# Patient Record
Sex: FEMALE | URBAN - METROPOLITAN AREA
[De-identification: names, ages, dates, MRNs, and addresses within clinical notes are randomized per-mention and may not be internally consistent; named-entity substitution may affect disease eponyms.]

---

## 2019-06-10 ENCOUNTER — APPOINTMENT (OUTPATIENT)
Age: 44
Setting detail: DERMATOLOGY
End: 2019-12-29

## 2019-06-10 VITALS
SYSTOLIC BLOOD PRESSURE: 142 MMHG | WEIGHT: 170.6 LBS | OXYGEN SATURATION: 97 % | DIASTOLIC BLOOD PRESSURE: 93 MMHG | HEART RATE: 85 BPM | RESPIRATION RATE: 17 BRPM

## 2019-06-10 DIAGNOSIS — Z41.1 ENCOUNTER FOR COSMETIC SURGERY: ICD-10-CM

## 2019-06-10 PROCEDURE — OTHER CONSULTATION - LIPOSUCTION: OTHER

## 2019-06-10 PROCEDURE — OTHER CONSULTATION - ABDOMINOPLASTY: OTHER

## 2019-06-10 ASSESSMENT — LOCATION DETAILED DESCRIPTION DERM
LOCATION DETAILED: RIGHT LATERAL ABDOMEN
LOCATION DETAILED: LEFT LATERAL ABDOMEN

## 2019-06-10 ASSESSMENT — LOCATION SIMPLE DESCRIPTION DERM: LOCATION SIMPLE: ABDOMEN

## 2019-06-10 ASSESSMENT — LOCATION ZONE DERM: LOCATION ZONE: TRUNK

## 2019-06-10 NOTE — PROCEDURE: CONSULTATION - LIPOSUCTION
Consultation Charge $ (Use Numbers Only, No Text Please.): 0
Detail Level: Simple
Send Procedure Quote As Charge: No

## 2019-06-10 NOTE — PROCEDURE: CONSULTATION - ABDOMINOPLASTY
Detail Level: Detailed
Send Procedure Quote As Charge: No
Consultation Charge $ (Use Numbers Only, No Text Please.): 0